# Patient Record
Sex: MALE | Race: WHITE
[De-identification: names, ages, dates, MRNs, and addresses within clinical notes are randomized per-mention and may not be internally consistent; named-entity substitution may affect disease eponyms.]

---

## 2018-01-28 ENCOUNTER — HOSPITAL ENCOUNTER (EMERGENCY)
Dept: HOSPITAL 25 - UCEAST | Age: 78
Discharge: HOME | End: 2018-01-28
Payer: COMMERCIAL

## 2018-01-28 VITALS — DIASTOLIC BLOOD PRESSURE: 63 MMHG | SYSTOLIC BLOOD PRESSURE: 111 MMHG

## 2018-01-28 DIAGNOSIS — K04.7: Primary | ICD-10-CM

## 2018-01-28 DIAGNOSIS — R03.0: ICD-10-CM

## 2018-01-28 DIAGNOSIS — F17.210: ICD-10-CM

## 2018-01-28 DIAGNOSIS — E78.5: ICD-10-CM

## 2018-01-28 PROCEDURE — 99212 OFFICE O/P EST SF 10 MIN: CPT

## 2018-01-28 PROCEDURE — G0463 HOSPITAL OUTPT CLINIC VISIT: HCPCS

## 2018-01-28 NOTE — UC
Carmelo BERRY Tiffany, scribed for Joi Uribe DO on 01/28/18 at 1245 .





 Dental HPI





- HPI Summary


HPI Summary: 


The patient is a 77 year old M presenting to [facility] with a chief complaint of 

waxing and waning lower jaw pain since three days ago. The pain is described as 

dull and aching. Pain and swelling was severe on arrival but as pt was waiting 

to be seen he noted that pain and swelling dimminished. The patient currently 

rates the pain 1/10 in severity. Symptoms aggravated by touch. Symptoms 

alleviated spontaneously. Patient reports jaw swelling, jaw soreness and 

insomnia. Patient denies fever, chills, and diaphoresis.





- History of Current Complaint


Chief Complaint: UCDentalProblem


Stated Complaint: DENTAL COMPLAINT


Time Seen by Provider: 01/28/18 11:52


Hx Obtained From: Patient


Onset/Duration: Lasting Days - Three days, Still Present


Severity: Mild


Pain Intensity: 1


Pain Scale Used: 0-10 Numeric


Aggravating Factor(s): Nothing


Alleviating Factor(s): Nothing





- Allergies/Home Medications


Allergies/Adverse Reactions: 


 Allergies











Allergy/AdvReac Type Severity Reaction Status Date / Time


 


No Known Allergies Allergy   Verified 01/28/18 10:42











Home Medications: 


 Home Medications





Ibuprofen [Ibuprofen 200 MG] 200 mg PO ONCE 01/28/18 [History Confirmed 01/28/18

]











PMH/Surg Hx/FS Hx/Imm Hx


Previously Healthy: No


Endocrine History: Dyslipidemia


Other Cardiovascular History: NEGATIVE: Hypertension





- Surgical History


Surgical History: Yes


Surgery Procedure, Year, and Place: tonsillectomy as a child





- Family History


Known Family History: Positive: Other - Mother had glaucoma


   Negative: Hypertension





- Social History


Alcohol Use: None


Substance Use Type: None


Smoking Status (MU): Light Every Day Tobacco Smoker


Type: Cigarettes


Amount Used/How Often: 1/2  PPD


Cessation Counseling: Patient Advised to Stop





Review of Systems


Constitutional: Negative - Fever, chills, diaphoresis


ENT: Dental Pain - Lower jaw pain, jaw swelling, jaw soreness


Psychological: Other - Insomnia


All Other Systems Reviewed And Are Negative: Yes





Physical Exam


Triage Information Reviewed: Yes


Vital Signs: 


 Initial Vital Signs











Temp  98.4 F   01/28/18 10:44


 


Pulse  69   01/28/18 10:44


 


Resp  18   01/28/18 10:44


 


BP  116/68   01/28/18 10:44


 


Pulse Ox  99   01/28/18 10:44











Vital Signs Reviewed: Yes





- Additional Comments


Appearance: Well-Appearing, No Pain Distress, Well-Nourished


Eyes: conjunctiva clear, no discharge


ENT: Hard of hearing, no muffled/hoarse voice. 


Dental: Gross decay, fractured teeth, there is an abscess at tooth 3 with what 

appears to be a newly opened fistula.


Neck: Normal, Supple


Respiratory/Lung Sounds: Lungs clear, Normal breath sounds, No respiratory 

distress, No accessory muscle use


Cardiovascular: RRR, No murmur


Musculoskeletal: Normal 


Neurological: Alert, muscle tone normal 


Psychiatric: Normal, age appropriate behavior


Skin: Normal, Warm, Dry, Normal color





Dental Complaint Course/Dx





- Course


Course Of Treatment: Patient will be discharged with prescription for Augmentin 

and follow up from dentist. The patient is agreeable with this plan. 

Medications reviewed. High blood pressure noted. The patient has been 

encouraged to quit smoking.





- Differential Dx/Diagnosis


Provider Diagnoses: Dental abscess. Elevated blood pressure without diagnosis 

of hypertension.





Discharge





- Discharge Plan


Condition: Stable


Disposition: HOME


Prescriptions: 


Amoxicillin/Clavulanate TAB* [Augmentin *] 875 mg PO BID #20 tab


Patient Education Materials:  Dental Abscess (ED)


Referrals: 


No Primary Care Phys,NOPCP [Primary Care Provider] - 


Additional Instructions: 


AUGMENTIN:


     Augmentin is a mixture of amoxicillin and clavulanate. Amoxicillin is a 

member of the penicillin family.  It covers the germs likely to cause ear, 

bronchial, and urinary infections better than plain penicillin.  The addition 

of clavulanate allows it to cover staph infections of the skin, as well as 

resistant cases of ear and sinus infections.  Your physician has chosen 

Augmentin for you because of the special nature of your situation.


     Augmentin is best taken with meals.  Nausea after taking the medication is 

rare, but can occur.  Diarrhea can occur, particularly in small children.  

Vaginal yeast infections, and oral thrush in infants are also common.  Contact 

your physician if these problems occur.


     Allergy to penicillins is common.  If you have had an allergic reaction to 

any drug of the penicillin family, you should never take any other penicillin.  

Notify your doctor at once if you develop hives, shortness of breath, swelling, 

or faintness.





ANYTIME YOU TAKE AN ANTIBIOTIC, IT IS IMPORTANT TO REPLENISH THE BODY'S SUPPLY 

OF "GOOD BACTERIA."  YOU CAN GET GOOD BACTERIA FROM HIGH QUALITY CULTURED FOODS 

SUCH AS LOCAL YOGURT, SOUR KRAUT, YONNY BRANDY, NATURALLY FERMENTED PICKLES AND 

PROBIOTIC DRINKS.  YOU CAN ALSO GET GOOD BACTERIA FROM A PROBIOTIC SUPPLEMENT.





STOP SMOKING:


     You should stop smoking.  The tar and chemicals in cigarette smoke are 

harmful.  Smoking has been shown to cause:


          emphysema


          chronic bronchitis


          lung cancer


          mouth and throat cancer


          stomach and pancreas cancer


          premature aging


          birth defects


     In addition, smoking increases ear and lung infections in children of 

smokers.





FOLLOW UP


YOU WILL NEED TO FOLLOW UP WITH A DENTIST.  WE HAVE GIVEN YOU A LIST OF LOCAL 

DENTIST.


AND


You should establish with a private physician for follow-up care.  If you are 

unable to get a timely appointment, or if you are worsening, call us or return 

for re-evaluation.





An additional resource available to assist in finding the appropriate physician 

for your health care needs is the [facility].  You may contact 

them by calling [phone].





The documentation as recorded by the Carmelo alvares Tiffany accurately reflects 

the service I personally performed and the decisions made by me, Joi Uribe DO.

## 2020-01-01 ENCOUNTER — HOSPITAL ENCOUNTER (EMERGENCY)
Dept: HOSPITAL 25 - ED | Age: 80
Discharge: HOME | End: 2020-01-01
Payer: MEDICARE

## 2020-01-01 VITALS — SYSTOLIC BLOOD PRESSURE: 125 MMHG | DIASTOLIC BLOOD PRESSURE: 72 MMHG

## 2020-01-01 DIAGNOSIS — R22.1: Primary | ICD-10-CM

## 2020-01-01 DIAGNOSIS — F17.210: ICD-10-CM

## 2020-01-01 DIAGNOSIS — E04.1: ICD-10-CM

## 2020-01-01 DIAGNOSIS — L03.221: ICD-10-CM

## 2020-01-01 LAB
ALBUMIN SERPL BCG-MCNC: 3.7 G/DL (ref 3.2–5.2)
ALBUMIN/GLOB SERPL: 1.3 {RATIO} (ref 1–3)
ALP SERPL-CCNC: 87 U/L (ref 34–104)
ALT SERPL W P-5'-P-CCNC: 8 U/L (ref 7–52)
ANION GAP SERPL CALC-SCNC: 7 MMOL/L (ref 2–11)
AST SERPL-CCNC: 10 U/L (ref 13–39)
BASOPHILS # BLD AUTO: 0.1 10^3/UL (ref 0–0.2)
BUN SERPL-MCNC: 26 MG/DL (ref 6–24)
BUN/CREAT SERPL: 17.3 (ref 8–20)
CALCIUM SERPL-MCNC: 8.8 MG/DL (ref 8.6–10.3)
CHLORIDE SERPL-SCNC: 109 MMOL/L (ref 101–111)
EOSINOPHIL # BLD AUTO: 0.6 10^3/UL (ref 0–0.6)
GLOBULIN SER CALC-MCNC: 2.8 G/DL (ref 2–4)
GLUCOSE SERPL-MCNC: 124 MG/DL (ref 70–100)
HCO3 SERPL-SCNC: 24 MMOL/L (ref 22–32)
HCT VFR BLD AUTO: 36 % (ref 42–52)
HGB BLD-MCNC: 12.2 G/DL (ref 14–18)
LYMPHOCYTES # BLD AUTO: 2.1 10^3/UL (ref 1–4.8)
MCH RBC QN AUTO: 31 PG (ref 27–31)
MCHC RBC AUTO-ENTMCNC: 34 G/DL (ref 31–36)
MCV RBC AUTO: 93 FL (ref 80–94)
MONOCYTES # BLD AUTO: 0.9 10^3/UL (ref 0–0.8)
NEUTROPHILS # BLD AUTO: 9.5 10^3/UL (ref 1.5–7.7)
NRBC # BLD AUTO: 0 10^3/UL
NRBC BLD QL AUTO: 0
PLATELET # BLD AUTO: 330 10^3/UL (ref 150–450)
POTASSIUM SERPL-SCNC: 3.8 MMOL/L (ref 3.5–5)
PROT SERPL-MCNC: 6.5 G/DL (ref 6.4–8.9)
RBC # BLD AUTO: 3.91 10^6 /UL (ref 4.18–5.48)
SODIUM SERPL-SCNC: 140 MMOL/L (ref 135–145)
TSH SERPL-ACNC: 1.62 MCIU/ML (ref 0.34–5.6)
WBC # BLD AUTO: 13.2 10^3/UL (ref 3.5–10.8)

## 2020-01-01 PROCEDURE — 96361 HYDRATE IV INFUSION ADD-ON: CPT

## 2020-01-01 PROCEDURE — 96374 THER/PROPH/DIAG INJ IV PUSH: CPT

## 2020-01-01 PROCEDURE — 36415 COLL VENOUS BLD VENIPUNCTURE: CPT

## 2020-01-01 PROCEDURE — 80053 COMPREHEN METABOLIC PANEL: CPT

## 2020-01-01 PROCEDURE — 85025 COMPLETE CBC W/AUTO DIFF WBC: CPT

## 2020-01-01 PROCEDURE — 84443 ASSAY THYROID STIM HORMONE: CPT

## 2020-01-01 PROCEDURE — 99283 EMERGENCY DEPT VISIT LOW MDM: CPT

## 2020-01-01 PROCEDURE — 86140 C-REACTIVE PROTEIN: CPT

## 2020-01-01 PROCEDURE — 70491 CT SOFT TISSUE NECK W/DYE: CPT

## 2020-01-01 NOTE — ED
Throat Pain/Nasal Congestion





- HPI Summary


HPI Summary: 





This pt is an 80 Y/O M presenting to [facility] with a CC of a swollen throat that 

has been present since at least 3 weeks prior to arrival but the pt states that 

it could have been present for a longer time. He states that he did not have a 

fever or increased cough, pain with smoking, or thyroid issues. He states that 

he was a previous smoker. He rates the pain a 2/10 in severity. He states that 

the mass is hard to the touch and he has increased pain with palpation. He 

denies any alleviating factors. He denies any abdominal pain or N/V/D. He 

denies any change in his voice.  He has no pertinent PMHx. 





- History of Current Complaint


Chief Complaint: EDThroatPain


Time Seen by Provider: 20 09:22


Hx Obtained From: Patient


Onset/Duration: Gradual Onset, Lasting Weeks - at least 3


Severity: Mild - 2/10


Cough: None


Related History: Smoking - 1/2 PPD





- Allergies/Home Medications


Allergies/Adverse Reactions: 


 Allergies











Allergy/AdvReac Type Severity Reaction Status Date / Time


 


No Known Allergies Allergy   Verified 20 09:21














PMH/Surg Hx/FS Hx/Imm Hx


Previously Healthy: Yes


Endocrine/Hematology History: 


   Denies: Hx Diabetes


Cardiovascular History: 


   Denies: Hx Hypertension


Respiratory History: 


   Denies: Hx Asthma


Sensory History: Reports: Hx Glaucoma


EENT History: Reports: Hx Hearing Problem





- Cancer History


Hx Chemotherapy: No


Hx Radiation Therapy: No





- Surgical History


Surgical History: Yes


Surgery Procedure, Year, and Place: tonsillectomy as a child





- Immunization History


Immunizations Up to Date: Yes


Infectious Disease History: No


Infectious Disease History: 


   Denies: Traveled Outside the [place] in Last 30 Days





- Family History


Known Family History: Positive: Other - Mother had glaucoma


   Negative: Hypertension





- Social History


Occupation: Retired


Lives: With Family


Alcohol Use: None


Hx Substance Use: No


Substance Use Type: Reports: None


Hx Tobacco Use: Yes


Smoking Status (MU): Light Every Day Tobacco Smoker


Type: Cigarettes


Amount Used/How Often: 1/2  PPD





Review of Systems


Negative: Fever


ENT: Negative - pain with swallowing


Positive: Other - growth on neck, hard to touch 


Negative: Cough


Negative: Abdominal Pain, Vomiting, Diarrhea, Nausea


Positive: Other - mass on neck, hard to touch 


All Other Systems Reviewed And Are Negative: Yes





Physical Exam





- Summary


Physical Exam Summary: 





Constitutional: Well-developed, Well-nourished, Alert. (-) Distressed


Skin: Warm, Dry


HENT: Normocephalic; Atraumatic, anterior swelling, erythema, no fluctuance, 

and firm to palpation in region of larynx. 


Eyes: Conjunctiva normal


Neck: Musculoskeletal ROM normal neck. (-) JVD, (-) Stridor, (-) Tracheal 

deviation


Cardio: Rhythm regular, rate normal, Heart sounds normal; Intact distal pulses; 

The pedal pulses are 2+ and symmetric. Radial pulses are 2+ and symmetric.


Pulmonary/Chest wall: Effort normal. (-) Respiratory distress, (-) Wheezes, (-) 

Rales, no stridor


Abd: Soft, (-) tenderness, (-) Distension, (-) Guarding, (-) Rebound


Musculoskeletal: (-) Edema


Neuro: Alert, Oriented x3


Psych: Mood and affect Normal





Triage Information Reviewed: Yes


Vital Signs On Initial Exam: 


 Initial Vitals











Temp Pulse Resp BP Pulse Ox


 


 98 F   67   16   132/74   96 


 


 20 09:14  20 09:14  20 09:14  20 09:14  20 09:14











Vital Signs Reviewed: Yes





Procedures





- Sedation


Patient Received Moderate/Deep Sedation with Procedure: No





Diagnostics





- Vital Signs


 Vital Signs











  Temp Pulse Resp BP Pulse Ox


 


 20 09:14  98 F  67  16  132/74  96














- Laboratory


Result Diagrams: 


 20 09:38





 20 09:38


Lab Statement: Any lab studies that have been ordered have been reviewed, and 

results considered in the medical decision making process.





- CT


  ** Neck CT


CT Interpretation Completed By: Radiologist


Summary of CT Findings: 1. THERE IS A SOFT TISSUE DENSITY MASS ALONG THE 

INFERIOR ASPECT OF THE FLOOR OF MOUTH AND.  ANTERIOR TO THE THYROID CARTILAGE 

AND HYOID BONE SUSPICIOUS FOR A MALIGNANT LESION. THE.  MARGINS OF THE MASS CAN 

BETTER BE DEFINED WITH MR IMAGING WITHOUT AND WITH CONTRAST.  IMAGING AS 

CLINICALLY NEEDED.  2. THERE IS A SMALL SOFT TISSUE DENSITY MASS WITHIN THE 

LARYNGEAL PHARYNX AT THE LEVEL OF.  THE TRUE VOCAL CORDS. RECOMMEND DIRECT 

INSPECTION FOR FURTHER EVALUATION.  3. THERE IS A SMALL NODULE IN THE LEFT 

THYROID GLAND. RECOMMEND AN OUTPATIENT ULTRASOUND.  FOR FURTHER EVALUATION.  4. 

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  ED 

physician has reviewed this report.





EENT Course/Dx





- Course


Course Of Treatment: This pt is an 80 Y/O M presenting to [facility] with a CC of a 

swollen throat that has been present since at least 3 weeks prior to arrival 

but the pt states that it could have been present for a longer time. He states 

that he did not have a fever or increased cough, pain with swallowing, or 

thyroid issues. He states that he was a previous smoker. He rates the pain a 2/

10 in severity.  His PE found that his neck has anterior swelling, erythema, no 

fluctuance, and firm to palpation in region of larynx.  He has no stridor on 

his respiratory exam.  He has abnormal laboratory values in WBC, RBC, Hgb, Hct, 

MPV, Absolute neuts, monos, BUN, Creatinine, Total Bilirubin, C-Reactive 

Protein.  His neck CT found the followin. THERE IS A SOFT TISSUE DENSITY 

MASS ALONG THE INFERIOR ASPECT OF THE FLOOR OF MOUTH AND.  ANTERIOR TO THE 

THYROID CARTILAGE AND HYOID BONE SUSPICIOUS FOR A MALIGNANT LESION. THE.  

MARGINS OF THE MASS CAN BETTER BE DEFINED WITH MR IMAGING WITHOUT AND WITH 

CONTRAST.  IMAGING AS CLINICALLY NEEDED.  2. THERE IS A SMALL SOFT TISSUE 

DENSITY MASS WITHIN THE LARYNGEAL PHARYNX AT THE LEVEL OF.  THE TRUE VOCAL 

CORDS. RECOMMEND DIRECT INSPECTION FOR FURTHER EVALUATION.  Discussed with Dr. Stephens, oncology, will follow up pt in the next couple days on an outpatient 

basis. Pt will likely need FNA for further evaluation. Given overlying erythema 

and warmth to the touch, presentation concerning for superimposed cellulitis, 

was given ancef in the ED and given script for Keflex.  3. THERE IS A SMALL 

NODULE IN THE LEFT THYROID GLAND. RECOMMEND AN OUTPATIENT ULTRASOUND.  FOR 

FURTHER EVALUATION.  4. 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 

VERTEBRAL BODIES.  He will be discharged home with a Dx of a neck mass and 

cellulitis. He will be pprescribed Keflex.





- Diagnoses


Provider Diagnoses: 


 Neck mass, Cellulitis








- Provider Notifications


Discussed Care Of Patient With: Scott Stephens


Time Discussed With Above Provider: 11:45


Instructed by Provider To: Other - Discussed with Dr. Stephens, oncology, will 

follow up pt in the next couple days on an outpatient basis. Pt will likely 

need FNA for further evaluation. Given overlying erythema and warmth to the 

touch, presentation concerning for superimposed cellulitis, was given ancef in 

the ED and given script for Keflex.





Discharge ED





- Sign-Out/Discharge


Documenting (check all that apply): Patient Departure - discharge 





- Discharge Plan


Condition: Stable


Disposition: HOME


Patient Education Materials:  Cellulitis (ED)


Referrals: 


Scott Stephens MD [Medical Doctor] - 2 Days


Additional Instructions: 


PLEASE FOLLOW UP WITH , ONCOLOGY, IN THE NEXT 1-3 DAYS. RETURN TO THE 

EMERGENCY DEPARTMENT FOR ANY NEW OR WORSENING SYMPTOMS. 





Discussed with Dr. Stephens, oncology, will follow up pt in the next couple days 

on an outpatient basis. Pt will likely need FNA for further evaluation. Given 

overlying erythema and warmth to the touch, presentation concerning for 

superimposed cellulitis, was given ancef in the ED and given script for Keflex. 





- Attestation Statements


Document Initiated by Scribe: Yes


Documenting Scribe: Miguel Doherty


Provider For Whom Scribe is Documenting (Include Credential): Damon Bowen DO


Scribe Attestation: 


IMiguel, scribed for Damon Bowen DO on 20 at 1142. 


Status of Scribe Document: Ready